# Patient Record
Sex: MALE | Race: OTHER | HISPANIC OR LATINO | URBAN - METROPOLITAN AREA
[De-identification: names, ages, dates, MRNs, and addresses within clinical notes are randomized per-mention and may not be internally consistent; named-entity substitution may affect disease eponyms.]

---

## 2018-05-11 ENCOUNTER — INPATIENT (INPATIENT)
Facility: HOSPITAL | Age: 28
LOS: 3 days | Discharge: HOME | DRG: 195 | End: 2018-05-15
Attending: HOSPITALIST | Admitting: HOSPITALIST
Payer: SELF-PAY

## 2018-05-11 VITALS
DIASTOLIC BLOOD PRESSURE: 80 MMHG | SYSTOLIC BLOOD PRESSURE: 137 MMHG | OXYGEN SATURATION: 93 % | RESPIRATION RATE: 20 BRPM | HEART RATE: 134 BPM | TEMPERATURE: 98 F

## 2018-05-11 DIAGNOSIS — J18.9 PNEUMONIA, UNSPECIFIED ORGANISM: ICD-10-CM

## 2018-05-11 DIAGNOSIS — E66.9 OBESITY, UNSPECIFIED: ICD-10-CM

## 2018-05-11 DIAGNOSIS — K76.0 FATTY (CHANGE OF) LIVER, NOT ELSEWHERE CLASSIFIED: ICD-10-CM

## 2018-05-11 LAB
ALBUMIN SERPL ELPH-MCNC: 3.5 G/DL — SIGNIFICANT CHANGE UP (ref 3.3–5.2)
ALP SERPL-CCNC: 93 U/L — SIGNIFICANT CHANGE UP (ref 40–120)
ALT FLD-CCNC: 112 U/L — HIGH
ANION GAP SERPL CALC-SCNC: 15 MMOL/L — SIGNIFICANT CHANGE UP (ref 5–17)
APPEARANCE UR: CLEAR — SIGNIFICANT CHANGE UP
AST SERPL-CCNC: 132 U/L — HIGH
BILIRUB SERPL-MCNC: 0.9 MG/DL — SIGNIFICANT CHANGE UP (ref 0.4–2)
BILIRUB UR-MCNC: ABNORMAL
BUN SERPL-MCNC: 12 MG/DL — SIGNIFICANT CHANGE UP (ref 8–20)
CALCIUM SERPL-MCNC: 8.5 MG/DL — LOW (ref 8.6–10.2)
CHLORIDE SERPL-SCNC: 93 MMOL/L — LOW (ref 98–107)
CO2 SERPL-SCNC: 24 MMOL/L — SIGNIFICANT CHANGE UP (ref 22–29)
COLOR SPEC: YELLOW — SIGNIFICANT CHANGE UP
CREAT SERPL-MCNC: 1.18 MG/DL — SIGNIFICANT CHANGE UP (ref 0.5–1.3)
CRP SERPL-MCNC: 25.3 MG/DL — HIGH (ref 0–0.4)
DIFF PNL FLD: ABNORMAL
EOSINOPHIL # BLD AUTO: 0 K/UL — SIGNIFICANT CHANGE UP (ref 0–0.5)
EOSINOPHIL NFR BLD AUTO: 0 % — SIGNIFICANT CHANGE UP (ref 0–5)
EPI CELLS # UR: SIGNIFICANT CHANGE UP
ERYTHROCYTE [SEDIMENTATION RATE] IN BLOOD: 30 MM/HR — HIGH (ref 0–20)
GLUCOSE SERPL-MCNC: 141 MG/DL — HIGH (ref 70–115)
GLUCOSE UR QL: NEGATIVE MG/DL — SIGNIFICANT CHANGE UP
HCT VFR BLD CALC: 38.2 % — LOW (ref 42–52)
HCT VFR BLD CALC: 39 % — LOW (ref 42–52)
HGB BLD-MCNC: 12.8 G/DL — LOW (ref 14–18)
HGB BLD-MCNC: 13.3 G/DL — LOW (ref 14–18)
HIV 1 & 2 AB SERPL IA.RAPID: SIGNIFICANT CHANGE UP
KETONES UR-MCNC: ABNORMAL
LACTATE BLDV-MCNC: 1.5 MMOL/L — SIGNIFICANT CHANGE UP (ref 0.5–2)
LEUKOCYTE ESTERASE UR-ACNC: ABNORMAL
LYMPHOCYTES # BLD AUTO: 1 K/UL — SIGNIFICANT CHANGE UP (ref 1–4.8)
LYMPHOCYTES # BLD AUTO: 11.5 % — LOW (ref 20–55)
MCHC RBC-ENTMCNC: 27.6 PG — SIGNIFICANT CHANGE UP (ref 27–31)
MCHC RBC-ENTMCNC: 28.1 PG — SIGNIFICANT CHANGE UP (ref 27–31)
MCHC RBC-ENTMCNC: 33.5 G/DL — SIGNIFICANT CHANGE UP (ref 32–36)
MCHC RBC-ENTMCNC: 34.1 G/DL — SIGNIFICANT CHANGE UP (ref 32–36)
MCV RBC AUTO: 82.3 FL — SIGNIFICANT CHANGE UP (ref 80–94)
MCV RBC AUTO: 82.5 FL — SIGNIFICANT CHANGE UP (ref 80–94)
MONOCYTES # BLD AUTO: 0.1 K/UL — SIGNIFICANT CHANGE UP (ref 0–0.8)
MONOCYTES NFR BLD AUTO: 0.8 % — LOW (ref 3–10)
NEUTROPHILS # BLD AUTO: 7.6 K/UL — SIGNIFICANT CHANGE UP (ref 1.8–8)
NEUTROPHILS NFR BLD AUTO: 86.9 % — HIGH (ref 37–73)
NITRITE UR-MCNC: NEGATIVE — SIGNIFICANT CHANGE UP
PH UR: 6 — SIGNIFICANT CHANGE UP (ref 5–8)
PLATELET # BLD AUTO: 100 K/UL — LOW (ref 150–400)
PLATELET # BLD AUTO: 129 K/UL — LOW (ref 150–400)
POTASSIUM SERPL-MCNC: 3.3 MMOL/L — LOW (ref 3.5–5.3)
POTASSIUM SERPL-SCNC: 3.3 MMOL/L — LOW (ref 3.5–5.3)
PROT SERPL-MCNC: 7.3 G/DL — SIGNIFICANT CHANGE UP (ref 6.6–8.7)
PROT UR-MCNC: 500 MG/DL
RAPID RVP RESULT: SIGNIFICANT CHANGE UP
RBC # BLD: 4.63 M/UL — SIGNIFICANT CHANGE UP (ref 4.6–6.2)
RBC # BLD: 4.74 M/UL — SIGNIFICANT CHANGE UP (ref 4.6–6.2)
RBC # FLD: 12.2 % — SIGNIFICANT CHANGE UP (ref 11–15.6)
RBC # FLD: 12.3 % — SIGNIFICANT CHANGE UP (ref 11–15.6)
RBC CASTS # UR COMP ASSIST: ABNORMAL /HPF (ref 0–4)
SODIUM SERPL-SCNC: 132 MMOL/L — LOW (ref 135–145)
SP GR SPEC: 1.01 — SIGNIFICANT CHANGE UP (ref 1.01–1.02)
UROBILINOGEN FLD QL: 8 MG/DL
WBC # BLD: 8.4 K/UL — SIGNIFICANT CHANGE UP (ref 4.8–10.8)
WBC # BLD: 8.8 K/UL — SIGNIFICANT CHANGE UP (ref 4.8–10.8)
WBC # FLD AUTO: 8.4 K/UL — SIGNIFICANT CHANGE UP (ref 4.8–10.8)
WBC # FLD AUTO: 8.8 K/UL — SIGNIFICANT CHANGE UP (ref 4.8–10.8)
WBC UR QL: SIGNIFICANT CHANGE UP

## 2018-05-11 PROCEDURE — 76705 ECHO EXAM OF ABDOMEN: CPT | Mod: 26

## 2018-05-11 PROCEDURE — 99223 1ST HOSP IP/OBS HIGH 75: CPT

## 2018-05-11 PROCEDURE — 93010 ELECTROCARDIOGRAM REPORT: CPT

## 2018-05-11 PROCEDURE — 99285 EMERGENCY DEPT VISIT HI MDM: CPT

## 2018-05-11 PROCEDURE — 71046 X-RAY EXAM CHEST 2 VIEWS: CPT | Mod: 26

## 2018-05-11 RX ORDER — AZITHROMYCIN 500 MG/1
TABLET, FILM COATED ORAL
Qty: 0 | Refills: 0 | Status: DISCONTINUED | OUTPATIENT
Start: 2018-05-11 | End: 2018-05-15

## 2018-05-11 RX ORDER — ACETAMINOPHEN 500 MG
650 TABLET ORAL ONCE
Qty: 0 | Refills: 0 | Status: COMPLETED | OUTPATIENT
Start: 2018-05-11 | End: 2018-05-11

## 2018-05-11 RX ORDER — CEFTRIAXONE 500 MG/1
1000 INJECTION, POWDER, FOR SOLUTION INTRAMUSCULAR; INTRAVENOUS EVERY 24 HOURS
Qty: 0 | Refills: 0 | Status: DISCONTINUED | OUTPATIENT
Start: 2018-05-11 | End: 2018-05-11

## 2018-05-11 RX ORDER — ENOXAPARIN SODIUM 100 MG/ML
40 INJECTION SUBCUTANEOUS EVERY 24 HOURS
Qty: 0 | Refills: 0 | Status: DISCONTINUED | OUTPATIENT
Start: 2018-05-11 | End: 2018-05-15

## 2018-05-11 RX ORDER — AZITHROMYCIN 500 MG/1
500 TABLET, FILM COATED ORAL EVERY 24 HOURS
Qty: 0 | Refills: 0 | Status: DISCONTINUED | OUTPATIENT
Start: 2018-05-12 | End: 2018-05-15

## 2018-05-11 RX ORDER — IBUPROFEN 200 MG
600 TABLET ORAL ONCE
Qty: 0 | Refills: 0 | Status: COMPLETED | OUTPATIENT
Start: 2018-05-11 | End: 2018-05-11

## 2018-05-11 RX ORDER — CEFTRIAXONE 500 MG/1
1000 INJECTION, POWDER, FOR SOLUTION INTRAMUSCULAR; INTRAVENOUS ONCE
Qty: 0 | Refills: 0 | Status: COMPLETED | OUTPATIENT
Start: 2018-05-11 | End: 2018-05-11

## 2018-05-11 RX ORDER — SODIUM CHLORIDE 9 MG/ML
1000 INJECTION INTRAMUSCULAR; INTRAVENOUS; SUBCUTANEOUS ONCE
Qty: 0 | Refills: 0 | Status: COMPLETED | OUTPATIENT
Start: 2018-05-11 | End: 2018-05-11

## 2018-05-11 RX ORDER — CEFTRIAXONE 500 MG/1
1 INJECTION, POWDER, FOR SOLUTION INTRAMUSCULAR; INTRAVENOUS ONCE
Qty: 0 | Refills: 0 | Status: DISCONTINUED | OUTPATIENT
Start: 2018-05-11 | End: 2018-05-11

## 2018-05-11 RX ORDER — CEFTRIAXONE 500 MG/1
1 INJECTION, POWDER, FOR SOLUTION INTRAMUSCULAR; INTRAVENOUS EVERY 24 HOURS
Qty: 0 | Refills: 0 | Status: DISCONTINUED | OUTPATIENT
Start: 2018-05-11 | End: 2018-05-11

## 2018-05-11 RX ORDER — CEFTRIAXONE 500 MG/1
1000 INJECTION, POWDER, FOR SOLUTION INTRAMUSCULAR; INTRAVENOUS EVERY 24 HOURS
Qty: 0 | Refills: 0 | Status: DISCONTINUED | OUTPATIENT
Start: 2018-05-11 | End: 2018-05-13

## 2018-05-11 RX ORDER — AZITHROMYCIN 500 MG/1
500 TABLET, FILM COATED ORAL ONCE
Qty: 0 | Refills: 0 | Status: COMPLETED | OUTPATIENT
Start: 2018-05-11 | End: 2018-05-11

## 2018-05-11 RX ORDER — POTASSIUM CHLORIDE 20 MEQ
20 PACKET (EA) ORAL ONCE
Qty: 0 | Refills: 0 | Status: COMPLETED | OUTPATIENT
Start: 2018-05-11 | End: 2018-05-11

## 2018-05-11 RX ADMIN — AZITHROMYCIN 255 MILLIGRAM(S): 500 TABLET, FILM COATED ORAL at 21:42

## 2018-05-11 RX ADMIN — Medication 600 MILLIGRAM(S): at 15:27

## 2018-05-11 RX ADMIN — Medication 650 MILLIGRAM(S): at 22:06

## 2018-05-11 RX ADMIN — SODIUM CHLORIDE 1000 MILLILITER(S): 9 INJECTION INTRAMUSCULAR; INTRAVENOUS; SUBCUTANEOUS at 12:44

## 2018-05-11 RX ADMIN — Medication 650 MILLIGRAM(S): at 12:43

## 2018-05-11 RX ADMIN — SODIUM CHLORIDE 9999 MILLILITER(S): 9 INJECTION INTRAMUSCULAR; INTRAVENOUS; SUBCUTANEOUS at 15:27

## 2018-05-11 RX ADMIN — Medication 20 MILLIEQUIVALENT(S): at 13:43

## 2018-05-11 RX ADMIN — CEFTRIAXONE 1000 MILLIGRAM(S): 500 INJECTION, POWDER, FOR SOLUTION INTRAMUSCULAR; INTRAVENOUS at 12:43

## 2018-05-11 NOTE — ED PROVIDER NOTE - ATTENDING CONTRIBUTION TO CARE
29 yo hm no pmh comes to ed with fever, shortness of breath , cough; pt noted with decreased appetite denies nausea and vomiting; pe o2 sat 93 ra  heent ncat, throat chest rhonchi rt lower lobe; abd soft ext no edema;  eval sepsis, pna , ivf, antibiotics oxygen; admit for pneumonia; pt with hypoxia  andunreliable for treatment and follow up

## 2018-05-11 NOTE — ED ADULT NURSE REASSESSMENT NOTE - NS ED NURSE REASSESS COMMENT FT1
pt lying supine in bed, states feeling better. pt took off 02 o2 sat while at rest 94%, informed of need to keep 02 in place, verbalizes understanding.

## 2018-05-11 NOTE — ED PROVIDER NOTE - ABDOMINAL TENDER
epigastric/left upper quadrant/right lower quadrant/umbilical/right upper quadrant/left lower quadrant/periumbilical

## 2018-05-11 NOTE — ED PROVIDER NOTE - NEUROLOGICAL, MLM
Alert and oriented, no focal deficits, no motor or sensory deficits. neck supple, - Brudzinski's sign
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
## 2018-05-11 NOTE — ED PROVIDER NOTE - OBJECTIVE STATEMENT
29 yo M PT, with no pertinent PmHX, presents to ED complaining of fever x 1 week. PT admits to fever, vomiting, chills, nausea, decreased appetite, abdominal pain, throat pain, cough and headache x 1 week. PT admits ts cough is productive with blood colored sputum. PT did not take temperature at home. PT took unknown medication for pain. PT denies recent travel and sick contacts. PT denies dizziness, photophobia, neck stiffness, chest pain, diarrhea, constipation, and any other acute symptoms.

## 2018-05-11 NOTE — ED PROVIDER NOTE - THROAT FINDINGS
NO DROOLING/no exudate/uvula midline/TONSILLAR SWELLING/NO STRIDOR/NO VESICLES/ULCERS/NO TONGUE ELEVATION/THROAT RED

## 2018-05-11 NOTE — H&P ADULT - NSHPPHYSICALEXAM_GEN_ALL_CORE
Obese, ill looking lethargy   Normocephalic,   EOMI PERRL  Neck supple no JVD  Chest Right lung zone crackles   Abd soft + BS not tender  No pedal edema, no calf tenderness  AAO X3 no focal neurologic deficit  Skin no rash

## 2018-05-11 NOTE — ED ADULT NURSE NOTE - OBJECTIVE STATEMENT
27 y/o male presents to the ed c/o cough, headache, fever, chills, and malaise x 7 days with no relief

## 2018-05-12 LAB
ANION GAP SERPL CALC-SCNC: 17 MMOL/L — SIGNIFICANT CHANGE UP (ref 5–17)
BUN SERPL-MCNC: 15 MG/DL — SIGNIFICANT CHANGE UP (ref 8–20)
CALCIUM SERPL-MCNC: 8.2 MG/DL — LOW (ref 8.6–10.2)
CHLORIDE SERPL-SCNC: 101 MMOL/L — SIGNIFICANT CHANGE UP (ref 98–107)
CO2 SERPL-SCNC: 19 MMOL/L — LOW (ref 22–29)
CREAT SERPL-MCNC: 1.05 MG/DL — SIGNIFICANT CHANGE UP (ref 0.5–1.3)
CULTURE RESULTS: NO GROWTH — SIGNIFICANT CHANGE UP
GLUCOSE SERPL-MCNC: 184 MG/DL — HIGH (ref 70–115)
HCT VFR BLD CALC: 38.8 % — LOW (ref 42–52)
HGB BLD-MCNC: 13.3 G/DL — LOW (ref 14–18)
LACTATE BLDV-MCNC: 1.8 MMOL/L — SIGNIFICANT CHANGE UP (ref 0.5–2)
LACTATE BLDV-MCNC: 2.5 MMOL/L — HIGH (ref 0.5–2)
MCHC RBC-ENTMCNC: 28.2 PG — SIGNIFICANT CHANGE UP (ref 27–31)
MCHC RBC-ENTMCNC: 34.3 G/DL — SIGNIFICANT CHANGE UP (ref 32–36)
MCV RBC AUTO: 82.2 FL — SIGNIFICANT CHANGE UP (ref 80–94)
PLATELET # BLD AUTO: 124 K/UL — LOW (ref 150–400)
POTASSIUM SERPL-MCNC: 3.6 MMOL/L — SIGNIFICANT CHANGE UP (ref 3.5–5.3)
POTASSIUM SERPL-SCNC: 3.6 MMOL/L — SIGNIFICANT CHANGE UP (ref 3.5–5.3)
RBC # BLD: 4.72 M/UL — SIGNIFICANT CHANGE UP (ref 4.6–6.2)
RBC # FLD: 12.4 % — SIGNIFICANT CHANGE UP (ref 11–15.6)
SODIUM SERPL-SCNC: 137 MMOL/L — SIGNIFICANT CHANGE UP (ref 135–145)
SPECIMEN SOURCE: SIGNIFICANT CHANGE UP
WBC # BLD: 9 K/UL — SIGNIFICANT CHANGE UP (ref 4.8–10.8)
WBC # FLD AUTO: 9 K/UL — SIGNIFICANT CHANGE UP (ref 4.8–10.8)

## 2018-05-12 PROCEDURE — 99232 SBSQ HOSP IP/OBS MODERATE 35: CPT

## 2018-05-12 RX ORDER — PANTOPRAZOLE SODIUM 20 MG/1
40 TABLET, DELAYED RELEASE ORAL
Qty: 0 | Refills: 0 | Status: DISCONTINUED | OUTPATIENT
Start: 2018-05-12 | End: 2018-05-15

## 2018-05-12 RX ORDER — IBUPROFEN 200 MG
600 TABLET ORAL ONCE
Qty: 0 | Refills: 0 | Status: COMPLETED | OUTPATIENT
Start: 2018-05-11 | End: 2018-05-12

## 2018-05-12 RX ORDER — IBUPROFEN 200 MG
400 TABLET ORAL THREE TIMES A DAY
Qty: 0 | Refills: 0 | Status: DISCONTINUED | OUTPATIENT
Start: 2018-05-12 | End: 2018-05-15

## 2018-05-12 RX ORDER — SODIUM CHLORIDE 9 MG/ML
500 INJECTION INTRAMUSCULAR; INTRAVENOUS; SUBCUTANEOUS ONCE
Qty: 0 | Refills: 0 | Status: COMPLETED | OUTPATIENT
Start: 2018-05-12 | End: 2018-05-12

## 2018-05-12 RX ORDER — SODIUM CHLORIDE 9 MG/ML
1000 INJECTION INTRAMUSCULAR; INTRAVENOUS; SUBCUTANEOUS
Qty: 0 | Refills: 0 | Status: DISCONTINUED | OUTPATIENT
Start: 2018-05-11 | End: 2018-05-15

## 2018-05-12 RX ORDER — IPRATROPIUM/ALBUTEROL SULFATE 18-103MCG
3 AEROSOL WITH ADAPTER (GRAM) INHALATION EVERY 6 HOURS
Qty: 0 | Refills: 0 | Status: DISCONTINUED | OUTPATIENT
Start: 2018-05-12 | End: 2018-05-15

## 2018-05-12 RX ORDER — POTASSIUM CHLORIDE 20 MEQ
40 PACKET (EA) ORAL ONCE
Qty: 0 | Refills: 0 | Status: COMPLETED | OUTPATIENT
Start: 2018-05-12 | End: 2018-05-12

## 2018-05-12 RX ADMIN — Medication 100 MILLIGRAM(S): at 19:06

## 2018-05-12 RX ADMIN — CEFTRIAXONE 1000 MILLIGRAM(S): 500 INJECTION, POWDER, FOR SOLUTION INTRAMUSCULAR; INTRAVENOUS at 05:45

## 2018-05-12 RX ADMIN — Medication 40 MILLIEQUIVALENT(S): at 19:05

## 2018-05-12 RX ADMIN — Medication 600 MILLIGRAM(S): at 00:12

## 2018-05-12 RX ADMIN — Medication 600 MILLIGRAM(S): at 00:13

## 2018-05-12 RX ADMIN — AZITHROMYCIN 255 MILLIGRAM(S): 500 TABLET, FILM COATED ORAL at 19:03

## 2018-05-12 RX ADMIN — SODIUM CHLORIDE 75 MILLILITER(S): 9 INJECTION INTRAMUSCULAR; INTRAVENOUS; SUBCUTANEOUS at 21:09

## 2018-05-12 RX ADMIN — SODIUM CHLORIDE 75 MILLILITER(S): 9 INJECTION INTRAMUSCULAR; INTRAVENOUS; SUBCUTANEOUS at 05:45

## 2018-05-12 RX ADMIN — Medication 400 MILLIGRAM(S): at 20:40

## 2018-05-12 RX ADMIN — SODIUM CHLORIDE 75 MILLILITER(S): 9 INJECTION INTRAMUSCULAR; INTRAVENOUS; SUBCUTANEOUS at 00:12

## 2018-05-12 RX ADMIN — Medication 400 MILLIGRAM(S): at 20:09

## 2018-05-12 RX ADMIN — SODIUM CHLORIDE 500 MILLILITER(S): 9 INJECTION INTRAMUSCULAR; INTRAVENOUS; SUBCUTANEOUS at 02:13

## 2018-05-12 RX ADMIN — ENOXAPARIN SODIUM 40 MILLIGRAM(S): 100 INJECTION SUBCUTANEOUS at 05:45

## 2018-05-12 NOTE — PROGRESS NOTE ADULT - PROBLEM SELECTOR PLAN 1
Patient blood cultures are pending, SOB and cough is improving slowly, will continue with ceftriaxone 1g iv qd and azithromycin 500mg iv qd, will give cough syrup, duo nebs, tylenol PRN.

## 2018-05-12 NOTE — CHART NOTE - NSCHARTNOTEFT_GEN_A_CORE
Pt admitted today for pneumonia with temp 103. sepsis workup ordered in ER. Pt on antibiotics. Temp now 101.5 (oral ) 2 hrs after tylenol. Pt without complaints  NAD VSS B/P 108/63 P 118 R 18 po 96% T 101.5 oral  Lactate ordered  motrin 600mg po  IV fluids NS 75cc /hr   continue to monitor  call pa if any changes in pts condition Pt admitted today for pneumonia with temp 103. sepsis workup ordered in ER. Pt on antibiotics. Temp now 101.5 (oral ) 2 hrs after tylenol. Pt without complaints  NAD VSS B/P 108/63 P 118 R 18 po 96% T 101.5 oral  Lactate ordered  motrin 600mg po  IV fluids NS 75cc /hr   continue to monitor  call pa if any changes in pts condition    02:15 am Repeat Lactate 2.5-- pt asymptomatic T 99  500cc Bolus  Repeat lactate and CBC for am

## 2018-05-12 NOTE — PROGRESS NOTE ADULT - PROBLEM SELECTOR PLAN 2
In setting of obesity, RUQ ultrasound done showed Fatty infiltration of liver, patient has no Hx of alcohol drinking, he has obesity, counselled for low caloric diet, will monitor LFTs.

## 2018-05-12 NOTE — PROGRESS NOTE ADULT - SUBJECTIVE AND OBJECTIVE BOX
ASHA GUIDRY    [id number]    28y      Male    Patient is a 28y old  Male who presents with a chief complaint of Cough fever (11 May 2018 17:37)      INTERVAL HPI/OVERNIGHT EVENTS:    Patient's SOB is improving, still has cough, he spiked fever intermittently, he has no nausea, vomiting, dizziness.     REVIEW OF SYSTEMS:    CONSTITUTIONAL: has intermittent fever, fatigue  RESPIRATORY: has cough, improving No shortness of breath  CARDIOVASCULAR: No chest pain, palpitations  GASTROINTESTINAL: No abdominal, No nausea, vomiting  NEUROLOGICAL: No headaches,  loss of strength.  MISCELLANEOUS: No joint swelling or pain       Vital Signs Last 24 Hrs  T(C): 38.1 (12 May 2018 09:25), Max: 38.6 (11 May 2018 23:58)  T(F): 100.6 (12 May 2018 09:25), Max: 101.5 (11 May 2018 23:58)  HR: 112 (12 May 2018 07:43) (84 - 118)  BP: 110/50 (12 May 2018 07:43) (102/58 - 116/84)  RR: 18 (12 May 2018 07:43) (18 - 24)  SpO2: 96% (12 May 2018 07:43) (94% - 97%)    PHYSICAL EXAM:    GENERAL: Young male looking comfortable   HEENT: PERRL, +EOMI  NECK: soft, Supple, No JVD,   CHEST/LUNG: decrease air entry at right lower lobe, and some rhonchi, No wheezing  HEART: S1S2+, Regular rate and rhythm; No murmurs  ABDOMEN: Soft, Nontender, Nondistended; Bowel sounds present  EXTREMITIES:  2+ Peripheral Pulses, No edema  SKIN: No rashes or lesions  NEURO: AAOX3, no focal deficits, no motor r sensory loss  PSYCH: normal mood      LABS:                        13.3   9.0   )-----------( 124      ( 12 May 2018 06:48 )             38.8     05-12    137  |  101  |  15.0  ----------------------------<  184<H>  3.6   |  19.0<L>  |  1.05    Ca    8.2<L>      12 May 2018 06:48    TPro  7.3  /  Alb  3.5  /  TBili  0.9  /  DBili  x   /  AST  132<H>  /  ALT  112<H>  /  AlkPhos  93  05-11      Urinalysis Basic - ( 11 May 2018 13:52 )    Color: Yellow / Appearance: Clear / S.015 / pH: x  Gluc: x / Ketone: Moderate  / Bili: Small / Urobili: 8 mg/dL   Blood: x / Protein: 500 mg/dL / Nitrite: Negative   Leuk Esterase: Trace / RBC: 3-5 /HPF / WBC 0-2   Sq Epi: x / Non Sq Epi: Occasional / Bacteria: x          I&O's Summary    11 May 2018 07:01  -  12 May 2018 07:00  --------------------------------------------------------  IN: 1330 mL / OUT: 0 mL / NET: 1330 mL        MEDICATIONS  (STANDING):  azithromycin  IVPB      azithromycin  IVPB 500 milliGRAM(s) IV Intermittent every 24 hours  cefTRIAXone Injectable. 1000 milliGRAM(s) IV Push every 24 hours  enoxaparin Injectable 40 milliGRAM(s) SubCutaneous every 24 hours  pantoprazole    Tablet 40 milliGRAM(s) Oral before breakfast  potassium chloride    Tablet ER 40 milliEquivalent(s) Oral once  sodium chloride 0.9%. 1000 milliLiter(s) (75 mL/Hr) IV Continuous <Continuous>    MEDICATIONS  (PRN):  ALBUTerol/ipratropium for Nebulization 3 milliLiter(s) Nebulizer every 6 hours PRN SOB or Wheezing  guaiFENesin    Syrup 100 milliGRAM(s) Oral every 6 hours PRN Cough  ibuprofen  Tablet 400 milliGRAM(s) Oral three times a day PRN Fever, pain or headache

## 2018-05-13 LAB
ALBUMIN SERPL ELPH-MCNC: 2.4 G/DL — LOW (ref 3.3–5.2)
ALBUMIN SERPL ELPH-MCNC: 2.6 G/DL — LOW (ref 3.3–5.2)
ALP SERPL-CCNC: 81 U/L — SIGNIFICANT CHANGE UP (ref 40–120)
ALP SERPL-CCNC: 88 U/L — SIGNIFICANT CHANGE UP (ref 40–120)
ALT FLD-CCNC: 68 U/L — HIGH
ALT FLD-CCNC: 73 U/L — HIGH
ANION GAP SERPL CALC-SCNC: 15 MMOL/L — SIGNIFICANT CHANGE UP (ref 5–17)
ANION GAP SERPL CALC-SCNC: 16 MMOL/L — SIGNIFICANT CHANGE UP (ref 5–17)
AST SERPL-CCNC: 77 U/L — HIGH
AST SERPL-CCNC: 83 U/L — HIGH
BILIRUB SERPL-MCNC: 0.4 MG/DL — SIGNIFICANT CHANGE UP (ref 0.4–2)
BILIRUB SERPL-MCNC: 0.5 MG/DL — SIGNIFICANT CHANGE UP (ref 0.4–2)
BUN SERPL-MCNC: 13 MG/DL — SIGNIFICANT CHANGE UP (ref 8–20)
BUN SERPL-MCNC: 13 MG/DL — SIGNIFICANT CHANGE UP (ref 8–20)
CALCIUM SERPL-MCNC: 8 MG/DL — LOW (ref 8.6–10.2)
CALCIUM SERPL-MCNC: 8.1 MG/DL — LOW (ref 8.6–10.2)
CHLORIDE SERPL-SCNC: 98 MMOL/L — SIGNIFICANT CHANGE UP (ref 98–107)
CHLORIDE SERPL-SCNC: 99 MMOL/L — SIGNIFICANT CHANGE UP (ref 98–107)
CO2 SERPL-SCNC: 20 MMOL/L — LOW (ref 22–29)
CO2 SERPL-SCNC: 20 MMOL/L — LOW (ref 22–29)
CREAT SERPL-MCNC: 0.71 MG/DL — SIGNIFICANT CHANGE UP (ref 0.5–1.3)
CREAT SERPL-MCNC: 0.81 MG/DL — SIGNIFICANT CHANGE UP (ref 0.5–1.3)
EOSINOPHIL # BLD AUTO: 0.2 K/UL — SIGNIFICANT CHANGE UP (ref 0–0.5)
EOSINOPHIL NFR BLD AUTO: 2 % — SIGNIFICANT CHANGE UP (ref 0–6)
GAS PNL BLDA: SIGNIFICANT CHANGE UP
GLUCOSE SERPL-MCNC: 101 MG/DL — SIGNIFICANT CHANGE UP (ref 70–115)
GLUCOSE SERPL-MCNC: 99 MG/DL — SIGNIFICANT CHANGE UP (ref 70–115)
HAV IGM SER-ACNC: SIGNIFICANT CHANGE UP
HBV CORE IGM SER-ACNC: SIGNIFICANT CHANGE UP
HBV SURFACE AG SER-ACNC: SIGNIFICANT CHANGE UP
HCT VFR BLD CALC: 29 % — LOW (ref 42–52)
HCV AB S/CO SERPL IA: 0.12 S/CO — SIGNIFICANT CHANGE UP
HCV AB SERPL-IMP: SIGNIFICANT CHANGE UP
HGB BLD-MCNC: 10.2 G/DL — LOW (ref 14–18)
HIV 1 & 2 AB SERPL IA.RAPID: SIGNIFICANT CHANGE UP
HYPOCHROMIA BLD QL: SLIGHT — SIGNIFICANT CHANGE UP
LACTATE BLDV-MCNC: 1.1 MMOL/L — SIGNIFICANT CHANGE UP (ref 0.5–2)
LIDOCAIN IGE QN: 184 U/L — HIGH (ref 22–51)
LYMPHOCYTES # BLD AUTO: 0.8 K/UL — LOW (ref 1–4.8)
LYMPHOCYTES # BLD AUTO: 9 % — LOW (ref 20–55)
MAGNESIUM SERPL-MCNC: 2 MG/DL — SIGNIFICANT CHANGE UP (ref 1.6–2.6)
MAGNESIUM SERPL-MCNC: 2 MG/DL — SIGNIFICANT CHANGE UP (ref 1.6–2.6)
MCHC RBC-ENTMCNC: 28.6 PG — SIGNIFICANT CHANGE UP (ref 27–31)
MCHC RBC-ENTMCNC: 35.2 G/DL — SIGNIFICANT CHANGE UP (ref 32–36)
MCV RBC AUTO: 81.2 FL — SIGNIFICANT CHANGE UP (ref 80–94)
MONOCYTES # BLD AUTO: 0.2 K/UL — SIGNIFICANT CHANGE UP (ref 0–0.8)
MONOCYTES NFR BLD AUTO: 3 % — SIGNIFICANT CHANGE UP (ref 3–10)
NEUTROPHILS # BLD AUTO: 7.8 K/UL — SIGNIFICANT CHANGE UP (ref 1.8–8)
NEUTROPHILS NFR BLD AUTO: 81 % — HIGH (ref 37–73)
NEUTS BAND # BLD: 5 % — SIGNIFICANT CHANGE UP (ref 0–8)
PHOSPHATE SERPL-MCNC: 1.8 MG/DL — LOW (ref 2.4–4.7)
PHOSPHATE SERPL-MCNC: 2.1 MG/DL — LOW (ref 2.4–4.7)
PLAT MORPH BLD: NORMAL — SIGNIFICANT CHANGE UP
PLATELET # BLD AUTO: 118 K/UL — LOW (ref 150–400)
POIKILOCYTOSIS BLD QL AUTO: SLIGHT — SIGNIFICANT CHANGE UP
POTASSIUM SERPL-MCNC: 3.5 MMOL/L — SIGNIFICANT CHANGE UP (ref 3.5–5.3)
POTASSIUM SERPL-MCNC: 3.8 MMOL/L — SIGNIFICANT CHANGE UP (ref 3.5–5.3)
POTASSIUM SERPL-SCNC: 3.5 MMOL/L — SIGNIFICANT CHANGE UP (ref 3.5–5.3)
POTASSIUM SERPL-SCNC: 3.8 MMOL/L — SIGNIFICANT CHANGE UP (ref 3.5–5.3)
PROCALCITONIN SERPL-MCNC: 3.24 NG/ML — HIGH (ref 0–0.04)
PROT SERPL-MCNC: 5.9 G/DL — LOW (ref 6.6–8.7)
PROT SERPL-MCNC: 6.2 G/DL — LOW (ref 6.6–8.7)
RBC # BLD: 3.57 M/UL — LOW (ref 4.6–6.2)
RBC # FLD: 12.2 % — SIGNIFICANT CHANGE UP (ref 11–15.6)
RBC BLD AUTO: ABNORMAL
SODIUM SERPL-SCNC: 134 MMOL/L — LOW (ref 135–145)
SODIUM SERPL-SCNC: 134 MMOL/L — LOW (ref 135–145)
WBC # BLD: 9.2 K/UL — SIGNIFICANT CHANGE UP (ref 4.8–10.8)
WBC # FLD AUTO: 9.2 K/UL — SIGNIFICANT CHANGE UP (ref 4.8–10.8)

## 2018-05-13 PROCEDURE — 99232 SBSQ HOSP IP/OBS MODERATE 35: CPT

## 2018-05-13 PROCEDURE — 93010 ELECTROCARDIOGRAM REPORT: CPT

## 2018-05-13 PROCEDURE — 71045 X-RAY EXAM CHEST 1 VIEW: CPT | Mod: 26

## 2018-05-13 PROCEDURE — 71250 CT THORAX DX C-: CPT | Mod: 26

## 2018-05-13 PROCEDURE — 74176 CT ABD & PELVIS W/O CONTRAST: CPT | Mod: 26

## 2018-05-13 RX ORDER — POTASSIUM CHLORIDE 20 MEQ
40 PACKET (EA) ORAL ONCE
Qty: 0 | Refills: 0 | Status: COMPLETED | OUTPATIENT
Start: 2018-05-13 | End: 2018-05-13

## 2018-05-13 RX ORDER — SODIUM CHLORIDE 9 MG/ML
600 INJECTION, SOLUTION INTRAVENOUS ONCE
Qty: 0 | Refills: 0 | Status: DISCONTINUED | OUTPATIENT
Start: 2018-05-13 | End: 2018-05-13

## 2018-05-13 RX ORDER — SODIUM,POTASSIUM PHOSPHATES 278-250MG
1 POWDER IN PACKET (EA) ORAL
Qty: 0 | Refills: 0 | Status: COMPLETED | OUTPATIENT
Start: 2018-05-13 | End: 2018-05-14

## 2018-05-13 RX ORDER — SODIUM CHLORIDE 9 MG/ML
1000 INJECTION, SOLUTION INTRAVENOUS ONCE
Qty: 0 | Refills: 0 | Status: DISCONTINUED | OUTPATIENT
Start: 2018-05-13 | End: 2018-05-13

## 2018-05-13 RX ORDER — PIPERACILLIN AND TAZOBACTAM 4; .5 G/20ML; G/20ML
3.38 INJECTION, POWDER, LYOPHILIZED, FOR SOLUTION INTRAVENOUS ONCE
Qty: 0 | Refills: 0 | Status: COMPLETED | OUTPATIENT
Start: 2018-05-13 | End: 2018-05-13

## 2018-05-13 RX ORDER — PIPERACILLIN AND TAZOBACTAM 4; .5 G/20ML; G/20ML
3.38 INJECTION, POWDER, LYOPHILIZED, FOR SOLUTION INTRAVENOUS EVERY 6 HOURS
Qty: 0 | Refills: 0 | Status: DISCONTINUED | OUTPATIENT
Start: 2018-05-13 | End: 2018-05-15

## 2018-05-13 RX ORDER — SACCHAROMYCES BOULARDII 250 MG
250 POWDER IN PACKET (EA) ORAL
Qty: 0 | Refills: 0 | Status: DISCONTINUED | OUTPATIENT
Start: 2018-05-13 | End: 2018-05-15

## 2018-05-13 RX ADMIN — Medication 1 TABLET(S): at 18:21

## 2018-05-13 RX ADMIN — Medication 40 MILLIEQUIVALENT(S): at 12:21

## 2018-05-13 RX ADMIN — Medication 3 MILLILITER(S): at 12:38

## 2018-05-13 RX ADMIN — Medication 250 MILLIGRAM(S): at 18:21

## 2018-05-13 RX ADMIN — SODIUM CHLORIDE 75 MILLILITER(S): 9 INJECTION INTRAMUSCULAR; INTRAVENOUS; SUBCUTANEOUS at 12:25

## 2018-05-13 RX ADMIN — ENOXAPARIN SODIUM 40 MILLIGRAM(S): 100 INJECTION SUBCUTANEOUS at 06:13

## 2018-05-13 RX ADMIN — CEFTRIAXONE 1000 MILLIGRAM(S): 500 INJECTION, POWDER, FOR SOLUTION INTRAMUSCULAR; INTRAVENOUS at 06:12

## 2018-05-13 RX ADMIN — PIPERACILLIN AND TAZOBACTAM 25 GRAM(S): 4; .5 INJECTION, POWDER, LYOPHILIZED, FOR SOLUTION INTRAVENOUS at 18:21

## 2018-05-13 RX ADMIN — Medication 100 MILLIGRAM(S): at 04:31

## 2018-05-13 RX ADMIN — PIPERACILLIN AND TAZOBACTAM 200 GRAM(S): 4; .5 INJECTION, POWDER, LYOPHILIZED, FOR SOLUTION INTRAVENOUS at 08:35

## 2018-05-13 RX ADMIN — Medication 1 TABLET(S): at 12:21

## 2018-05-13 RX ADMIN — PIPERACILLIN AND TAZOBACTAM 25 GRAM(S): 4; .5 INJECTION, POWDER, LYOPHILIZED, FOR SOLUTION INTRAVENOUS at 12:21

## 2018-05-13 RX ADMIN — Medication 100 MILLIGRAM(S): at 12:28

## 2018-05-13 RX ADMIN — Medication 400 MILLIGRAM(S): at 12:21

## 2018-05-13 RX ADMIN — Medication 1 TABLET(S): at 21:46

## 2018-05-13 RX ADMIN — PANTOPRAZOLE SODIUM 40 MILLIGRAM(S): 20 TABLET, DELAYED RELEASE ORAL at 06:12

## 2018-05-13 RX ADMIN — Medication 1 TABLET(S): at 09:58

## 2018-05-13 RX ADMIN — Medication 400 MILLIGRAM(S): at 04:32

## 2018-05-13 RX ADMIN — AZITHROMYCIN 255 MILLIGRAM(S): 500 TABLET, FILM COATED ORAL at 23:23

## 2018-05-13 NOTE — PROGRESS NOTE ADULT - PROBLEM SELECTOR PLAN 1
Patient blood cultures are pending, SOB and cough is improving slowly, still spiking fever, he was on ceftriaxone 1g iv qd d/zion and has been started on zosyn will continue with azithromycin 500mg iv qd, will give cough syrup, duo nebs, Tylenol PRN.

## 2018-05-13 NOTE — PROCEDURE NOTE - NSPROCDETAILS_GEN_ALL_CORE
location identified, draped/prepped, sterile technique used/flushes easily/blood seen on insertion/secured in place/sterile technique, catheter placed/dressing applied
dressing applied/flushes easily/secured in place/sterile technique, catheter placed/location identified, draped/prepped, sterile technique used/blood seen on insertion

## 2018-05-13 NOTE — PROCEDURE NOTE - NSSITEPREP_SKIN_A_CORE
alcohol
Adherence to aseptic technique: hand hygiene prior to donning barriers (gown, gloves), don cap and mask, sterile drape over patient

## 2018-05-13 NOTE — CHART NOTE - NSCHARTNOTEFT_GEN_A_CORE
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  Associate with cough, abd pains and weakness. In ED temp 103 , tachypneic and low oxygen saturation 90-91 . CXR show right lobe pneumonia.  Vomiting no diarrhea Lipase 264. Abd u/s fatty infiltrate of the liver. (11 May 2018 17:37)    PAST MEDICAL & SURGICAL HISTORY:  No pertinent past medical history  No significant past surgical history      Allergies    No Known Allergies    Intolerances      MEDICATIONS  (STANDING):  azithromycin  IVPB      azithromycin  IVPB 500 milliGRAM(s) IV Intermittent every 24 hours  cefTRIAXone Injectable. 1000 milliGRAM(s) IV Push every 24 hours  enoxaparin Injectable 40 milliGRAM(s) SubCutaneous every 24 hours  multiple electrolytes Injection Type 1 Bolus 1000 milliLiter(s) IV Bolus once  multiple electrolytes Injection Type 1 Bolus 1000 milliLiter(s) IV Bolus once  multiple electrolytes Injection Type 1 Bolus 600 milliLiter(s) IV Bolus once  pantoprazole    Tablet 40 milliGRAM(s) Oral before breakfast  sodium chloride 0.9%. 1000 milliLiter(s) (75 mL/Hr) IV Continuous <Continuous>    MEDICATIONS  (PRN):  ALBUTerol/ipratropium for Nebulization 3 milliLiter(s) Nebulizer every 6 hours PRN SOB or Wheezing  guaiFENesin    Syrup 100 milliGRAM(s) Oral every 6 hours PRN Cough  ibuprofen  Tablet 400 milliGRAM(s) Oral three times a day PRN Fever, pain or headache      Vital Signs Last 24 Hrs  T(C): 38.9 (05-13-18 @ 04:28), Max: 38.9 (05-12-18 @ 20:08)  T(F): 102 (05-13-18 @ 04:28), Max: 102 (05-12-18 @ 20:08)  HR: 111 (05-13-18 @ 04:28) (87 - 120)  BP: 108/69 (05-13-18 @ 04:28) (87/64 - 143/74)  RR: 21 (05-13-18 @ 04:58) (17 - 21)  SpO2: 96% (05-13-18 @ 04:28) (96% - 99%)      I&O's Summary    11 May 2018 07:01  -  12 May 2018 07:00  --------------------------------------------------------  IN: 1330 mL / OUT: 0 mL / NET: 1330 mL    12 May 2018 07:01  -  13 May 2018 06:36  --------------------------------------------------------  IN: 550 mL / OUT: 0 mL / NET: 550 mL        PHYSICAL EXAM:    GENERAL: obese individual, increased RR,      NECK: Supple, No JVD  NERVOUS SYSTEM:  Alert & Oriented X 3,  Grossly moving all extremities   CHEST/LUNG: poor air entry rales, rhonchi, wheezing, or rubs  HEART: Regular rate and rhythm; No murmurs, rubs, or gallops  ABDOMEN: Soft, Nontender, Nondistended; Bowel sounds present  EXTREMITIES:  2+ Peripheral Pulses, No clubbing, cyanosis, or edema  SKIN: warm, turgor good,  capillary refill    <2 sec    or    >2 sec      Assessment-  1. Sepsis: multifocal PNA                                                                                                                       Intervention-  STAT Labs- CBC, CMP, S Lactate, Blood C/S x 2, UA, Urine C/S, CXR, CT chest abdomen, C.diff             Time drawn 5:33am  Antibiotic -  On Rocephin and Azithromycin since admission on 5/11                                                OTHER antibiotic due to- Will DC rocephin and start Zosyn since CT-chest is showing extensive multifocal PNA    Intervention - Fluids                                                                                                          Crystalloid fluid(Plasmalyte)  Rate (30ml/Kg in 500ml boluses Q 15 mins until goal)   Total Volume- 2300cc    Lactate 1.1  Plan:  1.) multifocal PNA; R-middle and right lower lobe   Pt. will be switched from rocephin to Zosyn for broader coverage.    2.) diarrhea  concern for C.diff  will order c.diff stool exam.  pre-liminary CT abdomen negative    Case discussed w Dr. 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  Associate with cough, abd pains and weakness. In ED temp 103 , tachypneic and low oxygen saturation 90-91 . CXR show right lobe pneumonia.  Vomiting no diarrhea Lipase 264. Abd u/s fatty infiltrate of the liver. (11 May 2018 17:37)    PAST MEDICAL & SURGICAL HISTORY:  No pertinent past medical history  No significant past surgical history      Allergies    No Known Allergies    Intolerances      MEDICATIONS  (STANDING):  azithromycin  IVPB      azithromycin  IVPB 500 milliGRAM(s) IV Intermittent every 24 hours  cefTRIAXone Injectable. 1000 milliGRAM(s) IV Push every 24 hours  enoxaparin Injectable 40 milliGRAM(s) SubCutaneous every 24 hours  multiple electrolytes Injection Type 1 Bolus 1000 milliLiter(s) IV Bolus once  multiple electrolytes Injection Type 1 Bolus 1000 milliLiter(s) IV Bolus once  multiple electrolytes Injection Type 1 Bolus 600 milliLiter(s) IV Bolus once  pantoprazole    Tablet 40 milliGRAM(s) Oral before breakfast  sodium chloride 0.9%. 1000 milliLiter(s) (75 mL/Hr) IV Continuous <Continuous>    MEDICATIONS  (PRN):  ALBUTerol/ipratropium for Nebulization 3 milliLiter(s) Nebulizer every 6 hours PRN SOB or Wheezing  guaiFENesin    Syrup 100 milliGRAM(s) Oral every 6 hours PRN Cough  ibuprofen  Tablet 400 milliGRAM(s) Oral three times a day PRN Fever, pain or headache      Vital Signs Last 24 Hrs  T(C): 38.9 (05-13-18 @ 04:28), Max: 38.9 (05-12-18 @ 20:08)  T(F): 102 (05-13-18 @ 04:28), Max: 102 (05-12-18 @ 20:08)  HR: 111 (05-13-18 @ 04:28) (87 - 120)  BP: 108/69 (05-13-18 @ 04:28) (87/64 - 143/74)  RR: 21 (05-13-18 @ 04:58) (17 - 21)  SpO2: 96% (05-13-18 @ 04:28) (96% - 99%)      I&O's Summary    11 May 2018 07:01  -  12 May 2018 07:00  --------------------------------------------------------  IN: 1330 mL / OUT: 0 mL / NET: 1330 mL    12 May 2018 07:01  -  13 May 2018 06:36  --------------------------------------------------------  IN: 550 mL / OUT: 0 mL / NET: 550 mL        PHYSICAL EXAM:    GENERAL: obese individual, increased RR,      NECK: Supple, No JVD  NERVOUS SYSTEM:  Alert & Oriented X 3,  Grossly moving all extremities   CHEST/LUNG: poor air entry rales, rhonchi, wheezing, or rubs  HEART: Regular rate and rhythm; No murmurs, rubs, or gallops  ABDOMEN: Soft, Nontender, Nondistended; Bowel sounds present  EXTREMITIES:  2+ Peripheral Pulses, No clubbing, cyanosis, or edema  SKIN: warm, turgor good,  capillary refill    <2 sec    or    >2 sec      Assessment-  1. Sepsis: multifocal PNA                                                                                                                       Intervention-  STAT Labs- CBC, CMP, S Lactate, Blood C/S x 2, UA, Urine C/S, CXR, CT chest abdomen, C.diff             Time drawn 5:33am  Antibiotic -  On Rocephin and Azithromycin since admission on 5/11                                                OTHER antibiotic due to- Will DC rocephin and start Zosyn since CT-chest is showing extensive multifocal PNA    Intervention - Fluids                                                                                                          Crystalloid fluid(Plasmalyte)  Rate (30ml/Kg in 500ml boluses Q 15 mins until goal)   Total Volume- 2300cc    Lactate 1.1  Plan:  1.) multifocal PNA; R-middle and right lower lobe   Pt. will be switched from rocephin to Zosyn for broader coverage.  transfer to any bed w     2.) diarrhea  concern for C.diff  will order c.diff stool exam.  pre-liminary CT abdomen negative    Case discussed w Dr. Prather

## 2018-05-13 NOTE — PROGRESS NOTE ADULT - SUBJECTIVE AND OBJECTIVE BOX
ASHA GUIDRY    [id number]    28y      Male    Patient is a 28y old  Male who presents with a chief complaint of Cough fever (11 May 2018 17:37)      INTERVAL HPI/OVERNIGHT EVENTS:      Patient's SOB is improving, still has cough, he spiked fever intermittently, he has no nausea, vomiting, dizziness.     REVIEW OF SYSTEMS:    CONSTITUTIONAL: has intermittent fever, fatigue  RESPIRATORY: has cough, improving No shortness of breath  CARDIOVASCULAR: No chest pain, palpitations  GASTROINTESTINAL: No abdominal, No nausea, vomiting  NEUROLOGICAL: No headaches,  loss of strength.  MISCELLANEOUS: No joint swelling or pain       Vital Signs Last 24 Hrs  T(C): 36.8 (13 May 2018 07:48), Max: 38.9 (12 May 2018 20:08)  T(F): 98.3 (13 May 2018 07:48), Max: 102 (12 May 2018 20:08)  HR: 90 (13 May 2018 07:48) (87 - 123)  BP: 119/76 (13 May 2018 07:48) (87/64 - 143/74)  BP(mean): --  RR: 20 (13 May 2018 07:48) (17 - 23)  SpO2: 97% (13 May 2018 07:48) (90% - 99%)    PHYSICAL EXAM:    GENERAL: Young male looking comfortable   HEENT: PERRL, +EOMI  NECK: soft, Supple, No JVD,   CHEST/LUNG: decrease air entry at right lower lobe, and some rhonchi, No wheezing  HEART: S1S2+, Regular rate and rhythm; No murmurs  ABDOMEN: Soft, Nontender, Nondistended; Bowel sounds present  EXTREMITIES:  2+ Peripheral Pulses, No edema  SKIN: No rashes or lesions  NEURO: AAOX3, no focal deficits, no motor r sensory loss  PSYCH: normal mood      LABS:                        10.2   9.2   )-----------( 118      ( 13 May 2018 06:04 )             29.0     05-13    134<L>  |  98  |  13.0  ----------------------------<  99  3.5   |  20.0<L>  |  0.81    Ca    8.0<L>      13 May 2018 07:39  Phos  2.1     05-13  Mg     2.0     05-13    TPro  5.9<L>  /  Alb  2.4<L>  /  TBili  0.4  /  DBili  x   /  AST  77<H>  /  ALT  68<H>  /  AlkPhos  81  05-13      Urinalysis Basic - ( 11 May 2018 13:52 )    Color: Yellow / Appearance: Clear / S.015 / pH: x  Gluc: x / Ketone: Moderate  / Bili: Small / Urobili: 8 mg/dL   Blood: x / Protein: 500 mg/dL / Nitrite: Negative   Leuk Esterase: Trace / RBC: 3-5 /HPF / WBC 0-2   Sq Epi: x / Non Sq Epi: Occasional / Bacteria: x          I&O's Summary    12 May 2018 07:01  -  13 May 2018 07:00  --------------------------------------------------------  IN: 550 mL / OUT: 0 mL / NET: 550 mL        MEDICATIONS  (STANDING):  azithromycin  IVPB      azithromycin  IVPB 500 milliGRAM(s) IV Intermittent every 24 hours  enoxaparin Injectable 40 milliGRAM(s) SubCutaneous every 24 hours  pantoprazole    Tablet 40 milliGRAM(s) Oral before breakfast  piperacillin/tazobactam IVPB. 3.375 Gram(s) IV Intermittent every 6 hours  potassium acid phosphate/sodium acid phosphate tablet (K-PHOS No. 2) 1 Tablet(s) Oral four times a day with meals  potassium chloride    Tablet ER 40 milliEquivalent(s) Oral once  sodium chloride 0.9%. 1000 milliLiter(s) (75 mL/Hr) IV Continuous <Continuous>    MEDICATIONS  (PRN):  ALBUTerol/ipratropium for Nebulization 3 milliLiter(s) Nebulizer every 6 hours PRN SOB or Wheezing  guaiFENesin    Syrup 100 milliGRAM(s) Oral every 6 hours PRN Cough  ibuprofen  Tablet 400 milliGRAM(s) Oral three times a day PRN Fever, pain or headache ASHA GUIDRY    [id number]    28y      Male    Patient is a 28y old  Male who presents with a chief complaint of Cough fever (11 May 2018 17:37)      INTERVAL HPI/OVERNIGHT EVENTS:      Patient's SOB is improving, still has cough, he spiked fever intermittently, he has no nausea, vomiting, dizziness.     REVIEW OF SYSTEMS:    CONSTITUTIONAL: has intermittent fever, fatigue  RESPIRATORY: has cough, Improving shortness of breath  CARDIOVASCULAR: No chest pain, palpitations  GASTROINTESTINAL: No abdominal, No nausea, vomiting  NEUROLOGICAL: No headaches,  loss of strength.  MISCELLANEOUS: No joint swelling or pain       Vital Signs Last 24 Hrs  T(C): 36.8 (13 May 2018 07:48), Max: 38.9 (12 May 2018 20:08)  T(F): 98.3 (13 May 2018 07:48), Max: 102 (12 May 2018 20:08)  HR: 90 (13 May 2018 07:48) (87 - 123)  BP: 119/76 (13 May 2018 07:48) (87/64 - 143/74)  RR: 20 (13 May 2018 07:48) (17 - 23)  SpO2: 97% (13 May 2018 07:48) (90% - 99%)    PHYSICAL EXAM:    GENERAL: Young male looking comfortable   HEENT: PERRL, +EOMI  NECK: soft, Supple, No JVD,   CHEST/LUNG: decrease air entry at right lower lobe, and some rhonchi, No wheezing  HEART: S1S2+, Regular rate and rhythm; No murmurs  ABDOMEN: Soft, Nontender, Nondistended; Bowel sounds present  EXTREMITIES:  1+ Peripheral Pulses, No edema  SKIN: No rashes or lesions  NEURO: AAOX3, no focal deficits, no motor r sensory loss  PSYCH: normal mood      LABS:                        10.2   9.2   )-----------( 118      ( 13 May 2018 06:04 )             29.0     05-13    134<L>  |  98  |  13.0  ----------------------------<  99  3.5   |  20.0<L>  |  0.81    Ca    8.0<L>      13 May 2018 07:39  Phos  2.1     05-13  Mg     2.0     05-13    TPro  5.9<L>  /  Alb  2.4<L>  /  TBili  0.4  /  DBili  x   /  AST  77<H>  /  ALT  68<H>  /  AlkPhos  81  05-13      Urinalysis Basic - ( 11 May 2018 13:52 )    Color: Yellow / Appearance: Clear / S.015 / pH: x  Gluc: x / Ketone: Moderate  / Bili: Small / Urobili: 8 mg/dL   Blood: x / Protein: 500 mg/dL / Nitrite: Negative   Leuk Esterase: Trace / RBC: 3-5 /HPF / WBC 0-2   Sq Epi: x / Non Sq Epi: Occasional / Bacteria: x          I&O's Summary    12 May 2018 07:01  -  13 May 2018 07:00  --------------------------------------------------------  IN: 550 mL / OUT: 0 mL / NET: 550 mL        MEDICATIONS  (STANDING):  azithromycin  IVPB      azithromycin  IVPB 500 milliGRAM(s) IV Intermittent every 24 hours  enoxaparin Injectable 40 milliGRAM(s) SubCutaneous every 24 hours  pantoprazole    Tablet 40 milliGRAM(s) Oral before breakfast  piperacillin/tazobactam IVPB. 3.375 Gram(s) IV Intermittent every 6 hours  potassium acid phosphate/sodium acid phosphate tablet (K-PHOS No. 2) 1 Tablet(s) Oral four times a day with meals  potassium chloride    Tablet ER 40 milliEquivalent(s) Oral once  sodium chloride 0.9%. 1000 milliLiter(s) (75 mL/Hr) IV Continuous <Continuous>    MEDICATIONS  (PRN):  ALBUTerol/ipratropium for Nebulization 3 milliLiter(s) Nebulizer every 6 hours PRN SOB or Wheezing  guaiFENesin    Syrup 100 milliGRAM(s) Oral every 6 hours PRN Cough  ibuprofen  Tablet 400 milliGRAM(s) Oral three times a day PRN Fever, pain or headache

## 2018-05-14 LAB
ANION GAP SERPL CALC-SCNC: 15 MMOL/L — SIGNIFICANT CHANGE UP (ref 5–17)
BUN SERPL-MCNC: 10 MG/DL — SIGNIFICANT CHANGE UP (ref 8–20)
CALCIUM SERPL-MCNC: 8.1 MG/DL — LOW (ref 8.6–10.2)
CHLORIDE SERPL-SCNC: 96 MMOL/L — LOW (ref 98–107)
CO2 SERPL-SCNC: 22 MMOL/L — SIGNIFICANT CHANGE UP (ref 22–29)
CREAT SERPL-MCNC: 0.87 MG/DL — SIGNIFICANT CHANGE UP (ref 0.5–1.3)
GLUCOSE SERPL-MCNC: 101 MG/DL — SIGNIFICANT CHANGE UP (ref 70–115)
HCT VFR BLD CALC: 32.4 % — LOW (ref 42–52)
HGB BLD-MCNC: 10.9 G/DL — LOW (ref 14–18)
MCHC RBC-ENTMCNC: 27.6 PG — SIGNIFICANT CHANGE UP (ref 27–31)
MCHC RBC-ENTMCNC: 33.6 G/DL — SIGNIFICANT CHANGE UP (ref 32–36)
MCV RBC AUTO: 82 FL — SIGNIFICANT CHANGE UP (ref 80–94)
PLATELET # BLD AUTO: 150 K/UL — SIGNIFICANT CHANGE UP (ref 150–400)
POTASSIUM SERPL-MCNC: 3.7 MMOL/L — SIGNIFICANT CHANGE UP (ref 3.5–5.3)
POTASSIUM SERPL-SCNC: 3.7 MMOL/L — SIGNIFICANT CHANGE UP (ref 3.5–5.3)
RBC # BLD: 3.95 M/UL — LOW (ref 4.6–6.2)
RBC # FLD: 12.5 % — SIGNIFICANT CHANGE UP (ref 11–15.6)
SODIUM SERPL-SCNC: 133 MMOL/L — LOW (ref 135–145)
WBC # BLD: 8 K/UL — SIGNIFICANT CHANGE UP (ref 4.8–10.8)
WBC # FLD AUTO: 8 K/UL — SIGNIFICANT CHANGE UP (ref 4.8–10.8)

## 2018-05-14 PROCEDURE — 99232 SBSQ HOSP IP/OBS MODERATE 35: CPT

## 2018-05-14 RX ADMIN — SODIUM CHLORIDE 75 MILLILITER(S): 9 INJECTION INTRAMUSCULAR; INTRAVENOUS; SUBCUTANEOUS at 05:52

## 2018-05-14 RX ADMIN — Medication 600 MILLIGRAM(S): at 08:25

## 2018-05-14 RX ADMIN — PIPERACILLIN AND TAZOBACTAM 25 GRAM(S): 4; .5 INJECTION, POWDER, LYOPHILIZED, FOR SOLUTION INTRAVENOUS at 01:30

## 2018-05-14 RX ADMIN — AZITHROMYCIN 255 MILLIGRAM(S): 500 TABLET, FILM COATED ORAL at 17:34

## 2018-05-14 RX ADMIN — Medication 1 TABLET(S): at 17:33

## 2018-05-14 RX ADMIN — PANTOPRAZOLE SODIUM 40 MILLIGRAM(S): 20 TABLET, DELAYED RELEASE ORAL at 05:52

## 2018-05-14 RX ADMIN — Medication 1 TABLET(S): at 22:36

## 2018-05-14 RX ADMIN — Medication 1 TABLET(S): at 08:25

## 2018-05-14 RX ADMIN — SODIUM CHLORIDE 75 MILLILITER(S): 9 INJECTION INTRAMUSCULAR; INTRAVENOUS; SUBCUTANEOUS at 11:38

## 2018-05-14 RX ADMIN — Medication 400 MILLIGRAM(S): at 08:25

## 2018-05-14 RX ADMIN — SODIUM CHLORIDE 75 MILLILITER(S): 9 INJECTION INTRAMUSCULAR; INTRAVENOUS; SUBCUTANEOUS at 17:33

## 2018-05-14 RX ADMIN — PIPERACILLIN AND TAZOBACTAM 25 GRAM(S): 4; .5 INJECTION, POWDER, LYOPHILIZED, FOR SOLUTION INTRAVENOUS at 23:21

## 2018-05-14 RX ADMIN — SODIUM CHLORIDE 75 MILLILITER(S): 9 INJECTION INTRAMUSCULAR; INTRAVENOUS; SUBCUTANEOUS at 22:35

## 2018-05-14 RX ADMIN — Medication 250 MILLIGRAM(S): at 17:33

## 2018-05-14 RX ADMIN — PIPERACILLIN AND TAZOBACTAM 25 GRAM(S): 4; .5 INJECTION, POWDER, LYOPHILIZED, FOR SOLUTION INTRAVENOUS at 17:34

## 2018-05-14 RX ADMIN — ENOXAPARIN SODIUM 40 MILLIGRAM(S): 100 INJECTION SUBCUTANEOUS at 05:51

## 2018-05-14 RX ADMIN — PIPERACILLIN AND TAZOBACTAM 25 GRAM(S): 4; .5 INJECTION, POWDER, LYOPHILIZED, FOR SOLUTION INTRAVENOUS at 11:38

## 2018-05-14 RX ADMIN — Medication 250 MILLIGRAM(S): at 05:52

## 2018-05-14 RX ADMIN — PIPERACILLIN AND TAZOBACTAM 25 GRAM(S): 4; .5 INJECTION, POWDER, LYOPHILIZED, FOR SOLUTION INTRAVENOUS at 07:01

## 2018-05-14 RX ADMIN — Medication 1 TABLET(S): at 11:38

## 2018-05-14 NOTE — PROGRESS NOTE ADULT - PROBLEM SELECTOR PLAN 1
Patient blood cultures are negative, SOB and cough is improving, no more fever spikes, he was on ceftriaxone 1g iv qd d/zion and has been started on zosyn will continue along with azithromycin 500mg iv qd, will continued with cough syrup, duo nebs, Tylenol PRN.

## 2018-05-14 NOTE — PROGRESS NOTE ADULT - SUBJECTIVE AND OBJECTIVE BOX
ASHA GUIDRY    [id number]    28y      Male    Patient is a 28y old  Male who presents with a chief complaint of Cough fever (11 May 2018 17:37)      INTERVAL HPI/OVERNIGHT EVENTS:    Patient's SOB is better, cough is improving, no more fevers, he has no nausea, vomiting, dizziness.     REVIEW OF SYSTEMS:    CONSTITUTIONAL: no more fever, has fatigue  RESPIRATORY: Improving cough and  shortness of breath  CARDIOVASCULAR: No chest pain, palpitations  GASTROINTESTINAL: No abdominal, No nausea, vomiting  NEUROLOGICAL: No headaches,  loss of strength.  MISCELLANEOUS: No joint swelling or pain        Vital Signs Last 24 Hrs  T(C): 36.6 (13 May 2018 16:14), Max: 36.8 (13 May 2018 11:33)  T(F): 97.8 (13 May 2018 16:14), Max: 98.2 (13 May 2018 11:33)  HR: 88 (13 May 2018 16:14) (88 - 111)  BP: 138/84 (13 May 2018 16:14) (126/83 - 138/84)  RR: 20 (13 May 2018 11:33) (20 - 20)  SpO2: 95% (14 May 2018 01:00) (90% - 98%)    PHYSICAL EXAM:    GENERAL: Young male looking comfortable   HEENT: PERRL, +EOMI  NECK: soft, Supple, No JVD,   CHEST/LUNG: decrease air entry at right lower lobe, no rhonchi, No wheezing  HEART: S1S2+, Regular rate and rhythm; No murmurs  ABDOMEN: Soft, Nontender, Nondistended; Bowel sounds present  EXTREMITIES:  2+ Peripheral Pulses, No edema  SKIN: No rashes or lesions  NEURO: AAOX3, no focal deficits, no motor r sensory loss  PSYCH: normal mood      LABS:                        10.9   8.0   )-----------( 150      ( 14 May 2018 08:04 )             32.4     05-14    133<L>  |  96<L>  |  10.0  ----------------------------<  101  3.7   |  22.0  |  0.87    Ca    8.1<L>      14 May 2018 08:03  Phos  2.1     05-13  Mg     2.0     05-13    TPro  5.9<L>  /  Alb  2.4<L>  /  TBili  0.4  /  DBili  x   /  AST  77<H>  /  ALT  68<H>  /  AlkPhos  81  05-13            I&O's Summary    13 May 2018 07:01  -  14 May 2018 07:00  --------------------------------------------------------  IN: 1250 mL / OUT: 3 mL / NET: 1247 mL        MEDICATIONS  (STANDING):  azithromycin  IVPB      azithromycin  IVPB 500 milliGRAM(s) IV Intermittent every 24 hours  enoxaparin Injectable 40 milliGRAM(s) SubCutaneous every 24 hours  pantoprazole    Tablet 40 milliGRAM(s) Oral before breakfast  piperacillin/tazobactam IVPB. 3.375 Gram(s) IV Intermittent every 6 hours  potassium acid phosphate/sodium acid phosphate tablet (K-PHOS No. 2) 1 Tablet(s) Oral four times a day with meals  saccharomyces boulardii 250 milliGRAM(s) Oral two times a day  sodium chloride 0.9%. 1000 milliLiter(s) (75 mL/Hr) IV Continuous <Continuous>    MEDICATIONS  (PRN):  ALBUTerol/ipratropium for Nebulization 3 milliLiter(s) Nebulizer every 6 hours PRN SOB or Wheezing  guaiFENesin    Syrup 100 milliGRAM(s) Oral every 6 hours PRN Cough  ibuprofen  Tablet 400 milliGRAM(s) Oral three times a day PRN Fever, pain or headache

## 2018-05-15 VITALS
TEMPERATURE: 98 F | OXYGEN SATURATION: 92 % | DIASTOLIC BLOOD PRESSURE: 75 MMHG | HEART RATE: 104 BPM | SYSTOLIC BLOOD PRESSURE: 120 MMHG | RESPIRATION RATE: 18 BRPM

## 2018-05-15 LAB
CULTURE RESULTS: NO GROWTH — SIGNIFICANT CHANGE UP
SPECIMEN SOURCE: SIGNIFICANT CHANGE UP

## 2018-05-15 PROCEDURE — 87086 URINE CULTURE/COLONY COUNT: CPT

## 2018-05-15 PROCEDURE — 94640 AIRWAY INHALATION TREATMENT: CPT

## 2018-05-15 PROCEDURE — 93005 ELECTROCARDIOGRAM TRACING: CPT

## 2018-05-15 PROCEDURE — 71250 CT THORAX DX C-: CPT

## 2018-05-15 PROCEDURE — T1013: CPT

## 2018-05-15 PROCEDURE — 99285 EMERGENCY DEPT VISIT HI MDM: CPT | Mod: 25

## 2018-05-15 PROCEDURE — 84145 PROCALCITONIN (PCT): CPT

## 2018-05-15 PROCEDURE — 83690 ASSAY OF LIPASE: CPT

## 2018-05-15 PROCEDURE — 80074 ACUTE HEPATITIS PANEL: CPT

## 2018-05-15 PROCEDURE — 99239 HOSP IP/OBS DSCHRG MGMT >30: CPT

## 2018-05-15 PROCEDURE — 85652 RBC SED RATE AUTOMATED: CPT

## 2018-05-15 PROCEDURE — 87581 M.PNEUMON DNA AMP PROBE: CPT

## 2018-05-15 PROCEDURE — 83605 ASSAY OF LACTIC ACID: CPT

## 2018-05-15 PROCEDURE — 96374 THER/PROPH/DIAG INJ IV PUSH: CPT

## 2018-05-15 PROCEDURE — 86140 C-REACTIVE PROTEIN: CPT

## 2018-05-15 PROCEDURE — 86703 HIV-1/HIV-2 1 RESULT ANTBDY: CPT

## 2018-05-15 PROCEDURE — 87798 DETECT AGENT NOS DNA AMP: CPT

## 2018-05-15 PROCEDURE — 71045 X-RAY EXAM CHEST 1 VIEW: CPT

## 2018-05-15 PROCEDURE — 82330 ASSAY OF CALCIUM: CPT

## 2018-05-15 PROCEDURE — 74176 CT ABD & PELVIS W/O CONTRAST: CPT

## 2018-05-15 PROCEDURE — 82803 BLOOD GASES ANY COMBINATION: CPT

## 2018-05-15 PROCEDURE — 84132 ASSAY OF SERUM POTASSIUM: CPT

## 2018-05-15 PROCEDURE — 82947 ASSAY GLUCOSE BLOOD QUANT: CPT

## 2018-05-15 PROCEDURE — 87633 RESP VIRUS 12-25 TARGETS: CPT

## 2018-05-15 PROCEDURE — 36600 WITHDRAWAL OF ARTERIAL BLOOD: CPT

## 2018-05-15 PROCEDURE — 87040 BLOOD CULTURE FOR BACTERIA: CPT

## 2018-05-15 PROCEDURE — 84100 ASSAY OF PHOSPHORUS: CPT

## 2018-05-15 PROCEDURE — 87486 CHLMYD PNEUM DNA AMP PROBE: CPT

## 2018-05-15 PROCEDURE — 84295 ASSAY OF SERUM SODIUM: CPT

## 2018-05-15 PROCEDURE — 76705 ECHO EXAM OF ABDOMEN: CPT

## 2018-05-15 PROCEDURE — 85014 HEMATOCRIT: CPT

## 2018-05-15 PROCEDURE — 83735 ASSAY OF MAGNESIUM: CPT

## 2018-05-15 PROCEDURE — 36415 COLL VENOUS BLD VENIPUNCTURE: CPT

## 2018-05-15 PROCEDURE — 80048 BASIC METABOLIC PNL TOTAL CA: CPT

## 2018-05-15 PROCEDURE — 71046 X-RAY EXAM CHEST 2 VIEWS: CPT

## 2018-05-15 PROCEDURE — 82435 ASSAY OF BLOOD CHLORIDE: CPT

## 2018-05-15 PROCEDURE — 81001 URINALYSIS AUTO W/SCOPE: CPT

## 2018-05-15 PROCEDURE — 80053 COMPREHEN METABOLIC PANEL: CPT

## 2018-05-15 PROCEDURE — 85027 COMPLETE CBC AUTOMATED: CPT

## 2018-05-15 RX ORDER — ALBUTEROL 90 UG/1
2 AEROSOL, METERED ORAL
Qty: 1 | Refills: 0 | OUTPATIENT
Start: 2018-05-15 | End: 2018-06-13

## 2018-05-15 RX ORDER — IBUPROFEN 200 MG
1 TABLET ORAL
Qty: 0 | Refills: 0 | COMMUNITY
Start: 2018-05-15

## 2018-05-15 RX ORDER — CIPROFLOXACIN LACTATE 400MG/40ML
1 VIAL (ML) INTRAVENOUS
Qty: 7 | Refills: 0 | OUTPATIENT
Start: 2018-05-15 | End: 2018-05-21

## 2018-05-15 RX ORDER — CEFPODOXIME PROXETIL 100 MG
1 TABLET ORAL
Qty: 14 | Refills: 0 | OUTPATIENT
Start: 2018-05-15 | End: 2018-05-21

## 2018-05-15 RX ADMIN — Medication 250 MILLIGRAM(S): at 06:34

## 2018-05-15 RX ADMIN — PIPERACILLIN AND TAZOBACTAM 25 GRAM(S): 4; .5 INJECTION, POWDER, LYOPHILIZED, FOR SOLUTION INTRAVENOUS at 06:34

## 2018-05-15 RX ADMIN — ENOXAPARIN SODIUM 40 MILLIGRAM(S): 100 INJECTION SUBCUTANEOUS at 06:34

## 2018-05-15 RX ADMIN — PANTOPRAZOLE SODIUM 40 MILLIGRAM(S): 20 TABLET, DELAYED RELEASE ORAL at 06:34

## 2018-05-15 NOTE — DISCHARGE NOTE ADULT - MEDICATION SUMMARY - MEDICATIONS TO TAKE
I will START or STAY ON the medications listed below when I get home from the hospital:    ibuprofen 400 mg oral tablet  -- 1 tab(s) by mouth 3 times a day, As needed, Fever, pain or headache  -- Indication: For Fever or pain     ProAir HFA 90 mcg/inh inhalation aerosol  -- 2 puff(s) inhaled 4 times a day, As Needed   -- For inhalation only.  It is very important that you take or use this exactly as directed.  Do not skip doses or discontinue unless directed by your doctor.  Obtain medical advice before taking any non-prescription drugs as some may affect the action of this medication.  Shake well before use.    -- Indication: For SOB     cefpodoxime 200 mg oral tablet  -- 1 tab(s) by mouth 2 times a day   -- Finish all this medication unless otherwise directed by prescriber.  Take with food or milk.    -- Indication: For Pneumia     guaiFENesin 100 mg/5 mL oral liquid  -- 5 milliliter(s) by mouth every 6 hours, As needed, Cough  -- Indication: For Cough I will START or STAY ON the medications listed below when I get home from the hospital:    ibuprofen 400 mg oral tablet  -- 1 tab(s) by mouth 3 times a day, As needed, Fever, pain or headache  -- Indication: For Fever or pain     ProAir HFA 90 mcg/inh inhalation aerosol  -- 2 puff(s) inhaled 4 times a day, As Needed   -- For inhalation only.  It is very important that you take or use this exactly as directed.  Do not skip doses or discontinue unless directed by your doctor.  Obtain medical advice before taking any non-prescription drugs as some may affect the action of this medication.  Shake well before use.    -- Indication: For SOB     guaiFENesin 100 mg/5 mL oral liquid  -- 5 milliliter(s) by mouth every 6 hours, As needed, Cough  -- Indication: For Cough     Levaquin 500 mg oral tablet  -- 1 tab(s) by mouth once a day   -- Avoid prolonged or excessive exposure to direct and/or artificial sunlight while taking this medication.  Do not take dairy products, antacids, or iron preparations within one hour of this medication.  Finish all this medication unless otherwise directed by prescriber.  May cause drowsiness or dizziness.  Medication should be taken with plenty of water.    -- Indication: For Pneumonia

## 2018-05-15 NOTE — DISCHARGE NOTE ADULT - PROVIDER TOKENS
FREE:[LAST:[[facility]],PHONE:[(   )    -],FAX:[(   )    -],ADDRESS:[For PMD call this clinic and get an appiontment,   [facility] in [place], [place]   Address: [address], [place] [address]  Phone: [phone]]]

## 2018-05-15 NOTE — DISCHARGE NOTE ADULT - CARE PROVIDER_API CALL
[facility] [place],   For PMD call this clinic and get an appiontment,   [facility] in [place], [place]   Address: [address] , [place] [address]  Phone: [phone]  Phone: (   )    -  Fax: (   )    -

## 2018-05-15 NOTE — DISCHARGE NOTE ADULT - HOSPITAL COURSE
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  Associate with cough, abd pains and weakness. In ED temp 103 , tachypneic and low oxygen saturation 90-91 . CXR show right lobe pneumonia.  Vomiting no diarrhea Lipase 264. Abd u/s fatty infiltrate of the liver.  He was admitted under medicine under impression of right lung pneumonia (CAP), Fatty liver, Obesity, patient's blood cultures were obtained and was started on Ceftriaxone and azithromycin, he was continue to spike fever, so ceftriaxone d/zion and  started on Zosyn, patient was given duo nebs and was monitored closely, blood cultures came negative, SOB improved, no more fever and cough is improving, he has completed azithromycin 500mg iv qd for 5 days, he was given cough syrup, duo nebs, Tylenol PRN over the course, he is able to ambulate and  feels improvement of his symptoms.   He was also found to have Fatty liver in setting of obesity, RUQ ultrasound done showed Fatty infiltration of liver, patient has no Hx of alcohol drinking, he has obesity, counselled for low caloric diet, was found to have mildly elevated LFTs came down, need to have follow up liver enzymes, hepatitis panel done and came negative, he was also found to have mildly elevated lipase level, he was not having any symptoms, that also needed to be followed as out patient, he was seen and followed by nutritionist over the course.      patient is doing well, his symptoms has been resolved, he is being discharged home in a stable condition.     Vital Signs Last 24 Hrs  T(C): 36.9 (15 May 2018 07:36), Max: 36.9 (14 May 2018 15:39)  T(F): 98.4 (15 May 2018 07:36), Max: 98.5 (14 May 2018 23:53)  HR: 83 (15 May 2018 07:36) (83 - 90)  BP: 116/65 (15 May 2018 07:36) (114/72 - 123/78)  RR: 18 (15 May 2018 07:36) (18 - 20)  SpO2: 96% (15 May 2018 09:06) (93% - 97%)    PHYSICAL EXAM:    GENERAL: Young male looking comfortable   HEENT: PERRL, +EOMI  NECK: soft, Supple, No JVD,   CHEST/LUNG: decrease air entry at right lower lobe, no rhonchi, with some crackles at Right lower lung zone, No wheezing  HEART: S1S2+, Regular rate and rhythm; No murmurs  ABDOMEN: Soft, Nontender, Nondistended; Bowel sounds present  EXTREMITIES:  2+ Peripheral Pulses, No edema  SKIN: No rashes or lesions  NEURO: AAOX3, no focal deficits, no motor r sensory loss  PSYCH: normal mood    Total time spent 40minutes

## 2018-05-15 NOTE — DISCHARGE NOTE ADULT - PATIENT PORTAL LINK FT
You can access the [facility] Patient Portal, offered by [facility], by registering with the following website: [email or web address]

## 2018-05-15 NOTE — DISCHARGE NOTE ADULT - PLAN OF CARE
continue with antibiotics for 7 more days Follow up with PMD in 1 week Diet and exercise as counselled Follow up with PCP Need to have follow up Liver function test in 2 weeks please ask you doctor to check your Lipase level as well.

## 2018-05-15 NOTE — DISCHARGE NOTE ADULT - CARE PLAN
Principal Discharge DX:	Pneumonia due to infectious organism, unspecified laterality, unspecified part of lung  Goal:	continue with antibiotics for 7 more days  Assessment and plan of treatment:	Follow up with PMD in 1 week  Secondary Diagnosis:	Fatty liver  Goal:	Diet and exercise as counselled  Assessment and plan of treatment:	Follow up with PCP  Secondary Diagnosis:	Obesity (BMI 30-39.9)  Goal:	Diet and exercise as counselled  Secondary Diagnosis:	Transaminitis  Goal:	Need to have follow up Liver function test in 2 weeks  Assessment and plan of treatment:	please ask you doctor to check your Lipase level as well.

## 2018-05-16 LAB
CULTURE RESULTS: SIGNIFICANT CHANGE UP
CULTURE RESULTS: SIGNIFICANT CHANGE UP
SPECIMEN SOURCE: SIGNIFICANT CHANGE UP
SPECIMEN SOURCE: SIGNIFICANT CHANGE UP

## 2019-09-21 NOTE — H&P ADULT - HISTORY OF PRESENT ILLNESS
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  Associate with cough, abd pains and weakness. In ED temp 103 , tachypneic and low oxygen saturation 90-91 . CXR show right lobe pneumonia.  Vomiting no diarrhea Lipase 264. Abd u/s fatty infiltrate of the liver. High blood pressure/Diabetes/History of a stroke or TIA